# Patient Record
Sex: FEMALE | Race: WHITE | URBAN - METROPOLITAN AREA
[De-identification: names, ages, dates, MRNs, and addresses within clinical notes are randomized per-mention and may not be internally consistent; named-entity substitution may affect disease eponyms.]

---

## 2017-04-14 ENCOUNTER — IMPORTED ENCOUNTER (OUTPATIENT)
Dept: URBAN - METROPOLITAN AREA CLINIC 50 | Facility: CLINIC | Age: 67
End: 2017-04-14

## 2017-07-06 ENCOUNTER — IMPORTED ENCOUNTER (OUTPATIENT)
Dept: URBAN - METROPOLITAN AREA CLINIC 50 | Facility: CLINIC | Age: 67
End: 2017-07-06

## 2017-07-17 ENCOUNTER — IMPORTED ENCOUNTER (OUTPATIENT)
Dept: URBAN - METROPOLITAN AREA CLINIC 50 | Facility: CLINIC | Age: 67
End: 2017-07-17

## 2018-07-09 ENCOUNTER — IMPORTED ENCOUNTER (OUTPATIENT)
Dept: URBAN - METROPOLITAN AREA CLINIC 50 | Facility: CLINIC | Age: 68
End: 2018-07-09

## 2018-07-23 ENCOUNTER — IMPORTED ENCOUNTER (OUTPATIENT)
Dept: URBAN - METROPOLITAN AREA CLINIC 50 | Facility: CLINIC | Age: 68
End: 2018-07-23

## 2018-10-25 ENCOUNTER — IMPORTED ENCOUNTER (OUTPATIENT)
Dept: URBAN - METROPOLITAN AREA CLINIC 50 | Facility: CLINIC | Age: 68
End: 2018-10-25

## 2019-07-11 ENCOUNTER — IMPORTED ENCOUNTER (OUTPATIENT)
Dept: URBAN - METROPOLITAN AREA CLINIC 50 | Facility: CLINIC | Age: 69
End: 2019-07-11

## 2020-09-17 ENCOUNTER — IMPORTED ENCOUNTER (OUTPATIENT)
Dept: URBAN - METROPOLITAN AREA CLINIC 50 | Facility: CLINIC | Age: 70
End: 2020-09-17

## 2020-12-28 ENCOUNTER — IMPORTED ENCOUNTER (OUTPATIENT)
Dept: URBAN - METROPOLITAN AREA CLINIC 50 | Facility: CLINIC | Age: 70
End: 2020-12-28

## 2021-04-30 ENCOUNTER — PREPPED CHART (OUTPATIENT)
Dept: URBAN - METROPOLITAN AREA CLINIC 53 | Facility: CLINIC | Age: 71
End: 2021-04-30

## 2021-04-30 NOTE — PATIENT DISCUSSION
"""Continue Restasis both eyes twice a day ."" ""Continue Artificial tears both eyes two - four times a day ."" ""Start Flaxseed oil 1000 mg by mouth twice a day ."" ""Start FML Fluorometholone both eyes once a day . ""."

## 2021-05-14 ASSESSMENT — VISUAL ACUITY
OD_OTHER: 20/30. 20/50.
OD_SC: 20/25+
OS_BAT: 20/40
OD_BAT: 20/40
OS_OTHER: 20/40. 20/50.
OS_BAT: 20/30
OD_SC: 20/20
OD_CC: J1+
OS_CC: J1+@ 19 IN
OD_OTHER: 20/40. 20/50.
OS_SC: 20/20-1
OD_BAT: 20/30
OD_SC: 20/20
OS_SC: 20/20-1
OS_SC: 20/20-1
OS_SC: 20/20-
OS_CC: J1+
OD_SC: 20/20-1
OD_SC: 20/20-1
OD_CC: J1+
OS_SC: 20/20
OS_CC: J1+
OS_OTHER: 20/30. 20/60.
OD_CC: J1+@ 19 IN

## 2021-05-14 ASSESSMENT — TONOMETRY
OS_IOP_MMHG: 12
OS_IOP_MMHG: 13
OD_IOP_MMHG: 12
OD_IOP_MMHG: 11
OD_IOP_MMHG: 17
OS_IOP_MMHG: 11
OD_IOP_MMHG: 14
OS_IOP_MMHG: 16
OS_IOP_MMHG: 12
OD_IOP_MMHG: 12

## 2021-09-27 ENCOUNTER — ANNUAL COMPREHENSIVE EXAM (OUTPATIENT)
Dept: URBAN - METROPOLITAN AREA CLINIC 53 | Facility: CLINIC | Age: 71
End: 2021-09-27

## 2021-09-27 DIAGNOSIS — H16.223: ICD-10-CM

## 2021-09-27 DIAGNOSIS — E11.9: ICD-10-CM

## 2021-09-27 DIAGNOSIS — H43.813: ICD-10-CM

## 2021-09-27 PROCEDURE — 92014 COMPRE OPH EXAM EST PT 1/>: CPT

## 2021-09-27 ASSESSMENT — VISUAL ACUITY
OS_SC: 20/20-1
OD_SC: 20/20-2
OU_CC: J1+

## 2021-09-27 ASSESSMENT — TONOMETRY
OD_IOP_MMHG: 17
OS_IOP_MMHG: 16

## 2021-11-11 ENCOUNTER — APPOINTMENT (RX ONLY)
Dept: URBAN - METROPOLITAN AREA CLINIC 58 | Facility: CLINIC | Age: 71
Setting detail: DERMATOLOGY
End: 2021-11-11

## 2021-11-11 DIAGNOSIS — D22 MELANOCYTIC NEVI: ICD-10-CM | Status: STABLE

## 2021-11-11 DIAGNOSIS — D18.0 HEMANGIOMA: ICD-10-CM | Status: STABLE

## 2021-11-11 DIAGNOSIS — L82.0 INFLAMED SEBORRHEIC KERATOSIS: ICD-10-CM | Status: WORSENING

## 2021-11-11 DIAGNOSIS — L57.0 ACTINIC KERATOSIS: ICD-10-CM | Status: INADEQUATELY CONTROLLED

## 2021-11-11 DIAGNOSIS — L82.1 OTHER SEBORRHEIC KERATOSIS: ICD-10-CM | Status: STABLE

## 2021-11-11 DIAGNOSIS — L81.4 OTHER MELANIN HYPERPIGMENTATION: ICD-10-CM | Status: STABLE

## 2021-11-11 PROBLEM — D18.01 HEMANGIOMA OF SKIN AND SUBCUTANEOUS TISSUE: Status: ACTIVE | Noted: 2021-11-11

## 2021-11-11 PROBLEM — D22.5 MELANOCYTIC NEVI OF TRUNK: Status: ACTIVE | Noted: 2021-11-11

## 2021-11-11 PROCEDURE — 17000 DESTRUCT PREMALG LESION: CPT | Mod: 59

## 2021-11-11 PROCEDURE — ? LIQUID NITROGEN

## 2021-11-11 PROCEDURE — 17110 DESTRUCTION B9 LES UP TO 14: CPT

## 2021-11-11 PROCEDURE — ? COUNSELING

## 2021-11-11 PROCEDURE — ? TREATMENT REGIMEN

## 2021-11-11 PROCEDURE — 99203 OFFICE O/P NEW LOW 30 MIN: CPT | Mod: 25

## 2021-11-11 PROCEDURE — ? ADDITIONAL NOTES

## 2021-11-11 ASSESSMENT — LOCATION ZONE DERM
LOCATION ZONE: FACE
LOCATION ZONE: ARM
LOCATION ZONE: TRUNK

## 2021-11-11 ASSESSMENT — LOCATION DETAILED DESCRIPTION DERM
LOCATION DETAILED: RIGHT PROXIMAL DORSAL FOREARM
LOCATION DETAILED: EPIGASTRIC SKIN
LOCATION DETAILED: LEFT INFERIOR CENTRAL MALAR CHEEK
LOCATION DETAILED: PERIUMBILICAL SKIN

## 2021-11-11 ASSESSMENT — LOCATION SIMPLE DESCRIPTION DERM
LOCATION SIMPLE: LEFT CHEEK
LOCATION SIMPLE: ABDOMEN
LOCATION SIMPLE: RIGHT FOREARM

## 2021-11-11 NOTE — PROCEDURE: LIQUID NITROGEN
Detail Level: Detailed
Duration Of Freeze Thaw-Cycle (Seconds): 0
Show Applicator Variable?: Yes
Render Note In Bullet Format When Appropriate: No
Post-Care Instructions: I reviewed with the patient in detail post-care instructions. Patient is to wear sunprotection, and avoid picking at any of the treated lesions. Pt may apply Vaseline to crusted or scabbing areas.
Consent: The patient's consent was obtained including but not limited to risks of crusting, scabbing, blistering, scarring, darker or lighter pigmentary change, recurrence, incomplete removal and infection.
Medical Necessity Information: It is in your best interest to select a reason for this procedure from the list below. All of these items fulfill various CMS LCD requirements except the new and changing color options.
Medical Necessity Clause: This procedure was medically necessary because the lesions that were treated were:

## 2021-12-15 ENCOUNTER — APPOINTMENT (RX ONLY)
Dept: URBAN - METROPOLITAN AREA CLINIC 57 | Facility: CLINIC | Age: 71
Setting detail: DERMATOLOGY
End: 2021-12-15

## 2021-12-15 DIAGNOSIS — Z41.9 ENCOUNTER FOR PROCEDURE FOR PURPOSES OTHER THAN REMEDYING HEALTH STATE, UNSPECIFIED: ICD-10-CM

## 2021-12-15 PROCEDURE — ? COSMETIC CONSULTATION: BOTULINUM TOXIN

## 2021-12-15 PROCEDURE — ? COSMETIC CONSULTATION: FILLERS

## 2021-12-15 PROCEDURE — ? ADDITIONAL NOTES

## 2021-12-15 PROCEDURE — ? MEDICAL CONSULTATION: PRODUCTS

## 2021-12-15 NOTE — PROCEDURE: ADDITIONAL NOTES
Additional Notes: Educated pt the difference between the Botox and fillers.
Detail Level: Detailed
Render Risk Assessment In Note?: no

## 2022-03-10 ENCOUNTER — ESTABLISHED PATIENT (OUTPATIENT)
Dept: URBAN - METROPOLITAN AREA CLINIC 48 | Facility: CLINIC | Age: 72
End: 2022-03-10

## 2022-03-10 DIAGNOSIS — H43.812: ICD-10-CM

## 2022-03-10 DIAGNOSIS — Z79.4: ICD-10-CM

## 2022-03-10 DIAGNOSIS — E11.9: ICD-10-CM

## 2022-03-10 PROCEDURE — 92014 COMPRE OPH EXAM EST PT 1/>: CPT

## 2022-03-10 RX ORDER — CYCLOSPORINE 0.5 MG/ML: 1 EMULSION OPHTHALMIC TWICE A DAY

## 2022-03-10 ASSESSMENT — VISUAL ACUITY
OS_SC: 20/20-2
OD_SC: 20/25-2

## 2022-03-10 ASSESSMENT — TONOMETRY
OS_IOP_MMHG: 16
OD_IOP_MMHG: 16

## 2022-03-23 NOTE — PATIENT DISCUSSION
"""Annual Type 2 Diabetic eye exam with dilation. No diabetic retinopathy found. Recommend annual dilated examinations. Patient instructed to call office immediately if sudden changes in vision occur. Emphasized importance of good blood glucose control. Summary of care provided to the physician managing the ongoing diabetes care. """ [id number]

## 2022-04-14 ENCOUNTER — ESTABLISHED PATIENT (OUTPATIENT)
Dept: URBAN - METROPOLITAN AREA CLINIC 48 | Facility: CLINIC | Age: 72
End: 2022-04-14

## 2022-04-14 DIAGNOSIS — E11.9: ICD-10-CM

## 2022-04-14 DIAGNOSIS — H16.223: ICD-10-CM

## 2022-04-14 DIAGNOSIS — E05.00: ICD-10-CM

## 2022-04-14 DIAGNOSIS — H43.812: ICD-10-CM

## 2022-04-14 PROCEDURE — 92014 COMPRE OPH EXAM EST PT 1/>: CPT

## 2022-04-14 ASSESSMENT — VISUAL ACUITY
OS_SC: 20/20-1
OD_SC: 20/25-2

## 2022-04-14 ASSESSMENT — TONOMETRY
OS_IOP_MMHG: 16
OD_IOP_MMHG: 16

## 2022-04-14 NOTE — PATIENT DISCUSSION
Advised patient to continue use of preservative-free artificial tears QID OU, warm compresses BID OU, lid scrubs BID OU, and gel drops QHS OU.

## 2022-09-15 ENCOUNTER — ESTABLISHED PATIENT (OUTPATIENT)
Dept: URBAN - METROPOLITAN AREA CLINIC 49 | Facility: CLINIC | Age: 72
End: 2022-09-15

## 2022-09-15 DIAGNOSIS — H00.021: ICD-10-CM

## 2022-09-15 PROCEDURE — 92012 INTRM OPH EXAM EST PATIENT: CPT

## 2022-09-15 RX ORDER — CEPHALEXIN 500 MG/1: 1 CAPSULE ORAL TWICE A DAY

## 2022-09-15 ASSESSMENT — TONOMETRY
OS_IOP_MMHG: 16
OD_IOP_MMHG: 16

## 2022-09-15 ASSESSMENT — VISUAL ACUITY
OS_SC: 20/20
OD_SC: 20/20

## 2022-09-26 ENCOUNTER — EMERGENCY VISIT (OUTPATIENT)
Dept: URBAN - METROPOLITAN AREA CLINIC 53 | Facility: CLINIC | Age: 72
End: 2022-09-26

## 2022-09-26 DIAGNOSIS — H00.021: ICD-10-CM

## 2022-09-26 DIAGNOSIS — H16.223: ICD-10-CM

## 2022-09-26 PROCEDURE — 92012 INTRM OPH EXAM EST PATIENT: CPT

## 2022-09-26 ASSESSMENT — TONOMETRY
OS_IOP_MMHG: 14
OD_IOP_MMHG: 14

## 2022-09-26 ASSESSMENT — VISUAL ACUITY
OS_SC: 20/20
OD_SC: 20/20

## 2022-09-26 NOTE — PATIENT DISCUSSION
Discussed that it takes time for it to go away but if still bothersome in a week an ophthalmologist can remove it.

## 2022-09-26 NOTE — PATIENT DISCUSSION
Discontinue keflex.  Recommend to  warm compresses  BID OU. Patient can alternate with cool compresses for swelling or itching.

## 2022-09-26 NOTE — PATIENT DISCUSSION
Advised patient to continue use of preservative-free artificial tears QID OU, warm compresses BID OU.

## 2022-11-22 ENCOUNTER — FOLLOW UP (OUTPATIENT)
Dept: URBAN - METROPOLITAN AREA CLINIC 53 | Facility: CLINIC | Age: 72
End: 2022-11-22

## 2022-11-22 DIAGNOSIS — H02.413: ICD-10-CM

## 2022-11-22 DIAGNOSIS — H04.123: ICD-10-CM

## 2022-11-22 PROCEDURE — 92012 INTRM OPH EXAM EST PATIENT: CPT

## 2022-11-22 RX ORDER — POLYETHYLENE GLYCOL 400 2.5 MG/ML: 1 SOLUTION/ DROPS OPHTHALMIC

## 2022-11-22 ASSESSMENT — VISUAL ACUITY
OS_SC: 20/20
OU_SC: 20/20
OD_SC: 20/20

## 2022-11-22 ASSESSMENT — TONOMETRY
OS_IOP_MMHG: 13
OD_IOP_MMHG: 13

## 2022-11-22 NOTE — PATIENT DISCUSSION
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 Will lubricate with longstanding solutions blink TID and ointment at bedtime.

## 2023-06-07 ENCOUNTER — COMPREHENSIVE EXAM (OUTPATIENT)
Dept: URBAN - METROPOLITAN AREA CLINIC 49 | Facility: CLINIC | Age: 73
End: 2023-06-07

## 2023-06-07 DIAGNOSIS — H04.123: ICD-10-CM

## 2023-06-07 DIAGNOSIS — E05.00: ICD-10-CM

## 2023-06-07 DIAGNOSIS — H02.831: ICD-10-CM

## 2023-06-07 DIAGNOSIS — Z79.4: ICD-10-CM

## 2023-06-07 DIAGNOSIS — H02.403: ICD-10-CM

## 2023-06-07 DIAGNOSIS — E11.9: ICD-10-CM

## 2023-06-07 DIAGNOSIS — H43.812: ICD-10-CM

## 2023-06-07 DIAGNOSIS — H40.013: ICD-10-CM

## 2023-06-07 DIAGNOSIS — H02.834: ICD-10-CM

## 2023-06-07 PROCEDURE — 92014 COMPRE OPH EXAM EST PT 1/>: CPT

## 2023-06-07 ASSESSMENT — KERATOMETRY
OS_K2POWER_DIOPTERS: 45.25
OS_AXISANGLE_DEGREES: 3
OS_K1POWER_DIOPTERS: 44.00
OD_AXISANGLE2_DEGREES: 90
OD_AXISANGLE_DEGREES: 0
OS_AXISANGLE2_DEGREES: 93
OD_K1POWER_DIOPTERS: 43.75
OD_K2POWER_DIOPTERS: 43.75

## 2023-06-07 ASSESSMENT — VISUAL ACUITY
OD_CC: 20/20
OD_GLARE: 20/20
OS_SC: 20/20-2
OD_SC: 20/20-2
OD_GLARE: 20/20
OU_CC: J1+@16"
OS_GLARE: 20/20
OU_SC: 20/20
OS_GLARE: 20/25

## 2023-06-07 ASSESSMENT — TONOMETRY
OD_IOP_MMHG: 14
OS_IOP_MMHG: 14

## 2023-06-19 ENCOUNTER — DIAGNOSTICS ONLY (OUTPATIENT)
Dept: URBAN - METROPOLITAN AREA CLINIC 49 | Facility: CLINIC | Age: 73
End: 2023-06-19

## 2023-06-19 DIAGNOSIS — H02.831: ICD-10-CM

## 2023-06-19 DIAGNOSIS — H02.834: ICD-10-CM

## 2023-06-19 PROCEDURE — 92082 INTERMEDIATE VISUAL FIELD XM: CPT

## 2023-06-19 PROCEDURE — 92285 EXTERNAL OCULAR PHOTOGRAPHY: CPT

## 2023-06-19 ASSESSMENT — KERATOMETRY
OD_AXISANGLE_DEGREES: 0
OD_AXISANGLE2_DEGREES: 90
OS_AXISANGLE2_DEGREES: 93
OD_K1POWER_DIOPTERS: 43.75
OD_K2POWER_DIOPTERS: 43.75
OS_K1POWER_DIOPTERS: 44.00
OS_K2POWER_DIOPTERS: 45.25
OS_AXISANGLE_DEGREES: 3

## 2023-09-28 ENCOUNTER — CONSULTATION/EVALUATION (OUTPATIENT)
Dept: URBAN - METROPOLITAN AREA CLINIC 53 | Facility: CLINIC | Age: 73
End: 2023-09-28

## 2023-09-28 DIAGNOSIS — H02.423: ICD-10-CM

## 2023-09-28 DIAGNOSIS — H04.123: ICD-10-CM

## 2023-09-28 PROCEDURE — 99213 OFFICE O/P EST LOW 20 MIN: CPT

## 2023-09-28 ASSESSMENT — KERATOMETRY
OD_AXISANGLE2_DEGREES: 90
OS_AXISANGLE2_DEGREES: 93
OS_K1POWER_DIOPTERS: 44.00
OS_K2POWER_DIOPTERS: 45.25
OS_AXISANGLE_DEGREES: 3
OD_AXISANGLE_DEGREES: 0
OD_K2POWER_DIOPTERS: 43.75
OD_K1POWER_DIOPTERS: 43.75

## 2023-09-28 ASSESSMENT — VISUAL ACUITY
OS_SC: 20/20-1
OD_SC: 20/25

## 2023-09-28 ASSESSMENT — TONOMETRY
OS_IOP_MMHG: 13
OD_IOP_MMHG: 14

## 2023-11-06 ENCOUNTER — PRE-OP/H&P (OUTPATIENT)
Dept: URBAN - METROPOLITAN AREA CLINIC 49 | Facility: LOCATION | Age: 73
End: 2023-11-06

## 2023-11-06 DIAGNOSIS — H02.423: ICD-10-CM

## 2023-11-06 PROCEDURE — PREOP PRE OP VISIT

## 2023-11-06 ASSESSMENT — VISUAL ACUITY
OS_SC: 20/20
OD_SC: 20/20

## 2023-11-14 ENCOUNTER — SURGERY/PROCEDURE (OUTPATIENT)
Dept: URBAN - METROPOLITAN AREA SURGERY 16 | Facility: SURGERY | Age: 73
End: 2023-11-14

## 2023-11-14 DIAGNOSIS — H02.423: ICD-10-CM

## 2023-11-14 PROCEDURE — 6790450 RPR BLPOS LEVATOR RESCJ/ADVMNT XTRNL, BILATERAL

## 2023-11-22 ENCOUNTER — POST-OP (OUTPATIENT)
Dept: URBAN - METROPOLITAN AREA CLINIC 49 | Facility: LOCATION | Age: 73
End: 2023-11-22

## 2023-11-22 DIAGNOSIS — Z98.890: ICD-10-CM

## 2023-11-22 DIAGNOSIS — H04.123: ICD-10-CM

## 2023-11-22 PROCEDURE — 99024 POSTOP FOLLOW-UP VISIT: CPT

## 2023-11-22 ASSESSMENT — VISUAL ACUITY
OD_SC: 20/20
OS_SC: 20/20-1

## 2023-12-14 ENCOUNTER — POST-OP (OUTPATIENT)
Dept: URBAN - METROPOLITAN AREA CLINIC 53 | Facility: CLINIC | Age: 73
End: 2023-12-14

## 2023-12-14 DIAGNOSIS — H04.123: ICD-10-CM

## 2023-12-14 PROCEDURE — 99024 POSTOP FOLLOW-UP VISIT: CPT

## 2023-12-14 PROCEDURE — 92285 EXTERNAL OCULAR PHOTOGRAPHY: CPT

## 2023-12-14 ASSESSMENT — VISUAL ACUITY
OD_SC: 20/20
OU_SC: 20/20
OS_SC: 20/20

## 2023-12-14 ASSESSMENT — TONOMETRY
OS_IOP_MMHG: 15
OD_IOP_MMHG: 15

## 2024-01-10 ENCOUNTER — ESTABLISHED PATIENT (OUTPATIENT)
Dept: URBAN - METROPOLITAN AREA CLINIC 49 | Facility: LOCATION | Age: 74
End: 2024-01-10

## 2024-01-10 DIAGNOSIS — H02.886: ICD-10-CM

## 2024-01-10 DIAGNOSIS — H04.123: ICD-10-CM

## 2024-01-10 DIAGNOSIS — D23.122: ICD-10-CM

## 2024-01-10 DIAGNOSIS — Z98.890: ICD-10-CM

## 2024-01-10 PROCEDURE — 76514 ECHO EXAM OF EYE THICKNESS: CPT

## 2024-01-10 PROCEDURE — 99213 OFFICE O/P EST LOW 20 MIN: CPT

## 2024-01-10 RX ORDER — TOBRAMYCIN AND DEXAMETHASONE 1; 3 MG/ML; MG/ML
1 SUSPENSION/ DROPS OPHTHALMIC
Start: 2024-01-10

## 2024-01-10 ASSESSMENT — PACHYMETRY
OD_CT_UM: 587
OS_CT_UM: 589

## 2024-01-10 ASSESSMENT — VISUAL ACUITY
OD_SC: 20/30+3
OS_SC: 20/25-2
OD_PH: 20/25

## 2024-01-10 ASSESSMENT — TONOMETRY
OD_IOP_MMHG: 13
OS_IOP_MMHG: 10
OS_IOP_MMHG: 13
OD_IOP_MMHG: 10

## 2024-01-31 ENCOUNTER — ESTABLISHED PATIENT (OUTPATIENT)
Dept: URBAN - METROPOLITAN AREA CLINIC 49 | Facility: LOCATION | Age: 74
End: 2024-01-31

## 2024-01-31 DIAGNOSIS — H04.123: ICD-10-CM

## 2024-01-31 DIAGNOSIS — E05.00: ICD-10-CM

## 2024-01-31 PROCEDURE — 92012 INTRM OPH EXAM EST PATIENT: CPT

## 2024-01-31 PROCEDURE — 92134 CPTRZ OPH DX IMG PST SGM RTA: CPT

## 2024-01-31 ASSESSMENT — TONOMETRY
OS_IOP_MMHG: 12
OD_IOP_MMHG: 14
OS_IOP_MMHG: 15
OD_IOP_MMHG: 11

## 2024-01-31 ASSESSMENT — VISUAL ACUITY
OU_SC: 20/20-1
OS_SC: 20/20-1
OD_SC: 20/20-1

## 2024-03-26 ENCOUNTER — FOLLOW UP (OUTPATIENT)
Dept: URBAN - METROPOLITAN AREA CLINIC 53 | Facility: CLINIC | Age: 74
End: 2024-03-26

## 2024-03-26 DIAGNOSIS — E05.00: ICD-10-CM

## 2024-03-26 DIAGNOSIS — H04.123: ICD-10-CM

## 2024-03-26 PROCEDURE — 99213 OFFICE O/P EST LOW 20 MIN: CPT

## 2024-03-26 RX ORDER — CARBOXYMETHYLCELLULOSE SODIUM 10 MG/ML: 1 GEL OPHTHALMIC EVERY EVENING

## 2024-03-26 ASSESSMENT — TONOMETRY
OS_IOP_MMHG: 13
OS_IOP_MMHG: 16
OD_IOP_MMHG: 13
OD_IOP_MMHG: 16

## 2024-03-26 ASSESSMENT — VISUAL ACUITY
OS_SC: 20/20-2
OD_SC: 20/20-2

## 2025-04-22 ENCOUNTER — COMPREHENSIVE EXAM (OUTPATIENT)
Age: 75
End: 2025-04-22

## 2025-04-22 DIAGNOSIS — D23.122: ICD-10-CM

## 2025-04-22 DIAGNOSIS — E05.00: ICD-10-CM

## 2025-04-22 DIAGNOSIS — H02.402: ICD-10-CM

## 2025-04-22 DIAGNOSIS — H04.123: ICD-10-CM

## 2025-04-22 DIAGNOSIS — H40.013: ICD-10-CM

## 2025-04-22 DIAGNOSIS — E11.9: ICD-10-CM

## 2025-04-22 PROCEDURE — 99214 OFFICE O/P EST MOD 30 MIN: CPT

## 2025-08-29 ENCOUNTER — DIAGNOSTICS ONLY (OUTPATIENT)
Age: 75
End: 2025-08-29

## 2025-08-29 DIAGNOSIS — H40.013: ICD-10-CM

## 2025-08-29 PROCEDURE — 92133 CPTRZD OPH DX IMG PST SGM ON: CPT

## 2025-08-29 PROCEDURE — 92083 EXTENDED VISUAL FIELD XM: CPT
